# Patient Record
Sex: FEMALE | Race: WHITE | NOT HISPANIC OR LATINO | Employment: OTHER | RURAL
[De-identification: names, ages, dates, MRNs, and addresses within clinical notes are randomized per-mention and may not be internally consistent; named-entity substitution may affect disease eponyms.]

---

## 2021-01-05 ENCOUNTER — OFFICE VISIT CONVERTED (OUTPATIENT)
Dept: CARDIOLOGY | Facility: CLINIC | Age: 31
End: 2021-01-05
Attending: INTERNAL MEDICINE

## 2021-01-05 ENCOUNTER — CONVERSION ENCOUNTER (OUTPATIENT)
Dept: OTHER | Facility: HOSPITAL | Age: 31
End: 2021-01-05

## 2021-03-09 ENCOUNTER — OFFICE VISIT CONVERTED (OUTPATIENT)
Dept: CARDIOLOGY | Facility: CLINIC | Age: 31
End: 2021-03-09
Attending: INTERNAL MEDICINE

## 2021-03-09 ENCOUNTER — CONVERSION ENCOUNTER (OUTPATIENT)
Dept: CARDIOLOGY | Facility: CLINIC | Age: 31
End: 2021-03-09
Attending: INTERNAL MEDICINE

## 2021-05-10 NOTE — PROCEDURES
Procedure Note      Patient Name: Calli Urbano   Patient ID: [id number]   Sex: Female   YOB: 1990    Referring Provider: VIET VALE    Visit Date: March 9, 2021    Provider: Blaine Hoang MD   Location: [facility]   Location Address: [address] [place] [address], [facility]   [place], [place]  [id number]   Location Phone: [phone]          FINAL REPORT   CARDIAC MONITOR REPORT     30-DAY CARDIAC EVENT MONITOR      DURATION:   01/14/2021 - 02/12/2021    INDICATION:  Shortness of breath     FINDINGS:  The patient was monitored for 30 days. There were 14 total events, 8 were triggered by the patient and 6 were auto-triggered. There were episodes of SVT at a rate of age-predicted 200 with a narrow complex rhythm consistent with likely AV node reentry tachycardia. Otherwise there was sinus rhythm recorded.     CONCLUSION:  Abnormal event monitor showing episodes of narrow complex tachycardia consistent with SVT or AV node reentry with a heart rate of 200 to 220 beats per minute. These converted to sinus rhythm. They were symptomatic otherwise. There were episodes of mild sinus tachycardia noted. This report was discussed with the patient and treatment changes  were made at the time of the SVT.      TRISHA Hoang MD   CBD/pap                 Electronically Signed by: Judy Gillette-, Other -Author on March 15, 2021 09:02:49 AM  Electronically Co-signed by: Blaine Hoang MD -Reviewer on March 15, 2021 10:03:18 AM

## 2021-05-10 NOTE — H&P
History and Physical      Patient Name: Calli Urbano   Patient ID: [id number]   Sex: Female   YOB: 1990    Referring Provider: VIET VALE    Visit Date: January 5, 2021    Provider: Blaine Hoang MD   Location: [facility] [place]   Location Address: [address] [place] [address]  [id number]          Chief Complaint  · Palpitations      History Of Present Illness  Consult requested by: VIET VALE   Calli Urbano is a 30-year-old white female with a history of obesity. She has had intermittent palpitations for many years off and on. Sometimes it does not occur for many months and then it occurs daily for several weeks. It tended to be worse with pregnancy over time. In the past couple of months, she has had two episodes associated with shortness of breath lasting a couple of minutes each resulting in feeling of her pulse racing and dizziness. She did not have syncope. She denies chest pain.   PAST MEDICAL HISTORY: Morbid obesity; palpitations; history of heart murmur; depression. Surgical history negative.   PSYCHOSOCIAL HISTORY: Positive for history of mood change or depression. She is . Has caffeine daily. Denies alcohol or tobacco use. She is self-employed.   FAMILY HISTORY: Positive in father who had arrhythmia cardioversion at age 55.   CURRENT MEDICATIONS: Divalproex 250 mg b.i.d.; Sertraline 25 mg daily.   ALLERGIES: No known drug allergies.       Review of Systems  · Constitutional  o Admits  o : fatigue, good general health lately  o Denies  o : recent weight changes   · Eyes  o Denies  o : double vision  · HENT  o Denies  o : hearing loss or ringing, chronic sinus problem, swollen glands in neck  · Cardiovascular  o Admits  o : palpitations (fast, fluttering, or skipping beats), swelling (feet, ankles, hands), shortness of breath while walking or lying flat  o Denies  o : chest pain  · Respiratory  o Denies  o : chronic or frequent  "cough, asthma or wheezing, COPD  · Gastrointestinal  o Denies  o : ulcers, nausea or vomiting  · Neurologic  o Admits  o : headaches  o Denies  o : lightheaded or dizzy, stroke  · Musculoskeletal  o Admits  o : back pain  o Denies  o : joint pain  · Endocrine  o Denies  o : thyroid disease, diabetes, heat or cold intolerance, excessive thirst or urination  · Heme-Lymph  o Denies  o : bleeding or bruising tendency, anemia      Vitals  Date Time BP Position Site L\R Cuff Size HR RR TEMP (F) WT  HT  BMI kg/m2 BSA m2 O2 Sat FR L/min FiO2 HC       01/05/2021 02:13 /76 Sitting    100 - R   282lbs 0oz 5'  7\" 44.17 2.46             Physical Examination  · Constitutional  o Appearance  o : Obese white female, pleasant, in no acute distress.  · Head and Face  o HEENT  o : No pallor, anicteric. Eyes normal. Moist mucous membranes.  · Neck  o Inspection/Palpation  o : Supple. No hepatosplenomegaly.  o Jugular Veins  o : No JVD. No carotid bruits.  · Respiratory  o Auscultation of Lungs  o : Clear to auscultation bilaterally. No crackles or wheezing.  · Cardiovascular  o Heart  o : Grade 2 basal and parasternal systolic murmur. No diastolic murmur.  · Gastrointestinal  o Abdominal Examination  o : Soft, non-distended. No palpable hepatosplenomegaly. Bowel sounds heard in all four quadrants.  · Musculoskeletal  o General  o : Normal muscle tone and strength.  · Skin and Subcutaneous Tissue  o General Inspection  o : No skin rashes.  · Extremities  o Extremities  o : Warm and well perfused. Distal pulses present. No pitting pedal edema.  · EKG  o EKG  o : Reviewed by me.   o Results  o : Sinus rhythm, normal axis. No ST changes. Appears to be a normal EKG.  · Labs  o Labs  o : Recent labs show normal CMP, normal CBC, normal thyroid panel.     Primary care records reviewed.           Assessment     1.  Intermittent palpitations.   2.  Morbid obesity.   3.  Dizziness.          Plan     Based on the patient's symptoms, they are " relatively infrequent on average. A 30-day event monitor will be prescribed to try and catch these to see what her heart rhythm is during symptoms. An echocardiogram will be scheduled to evaluate her systolic murmur. We will call the patient with the results. If her workup is negative, a watchful waiting approach will be recommended for her palpitations if they are not frequent enough to catch on service monitoring. It is a pleasure to assist in her car. She is agreeable with this plan.      TRISHA Hoang MD   CBD/pap             Electronically Signed by: Judy Gillette-, Other -Author on January 8, 2021 11:42:54 AM  Electronically Co-signed by: Blaine Hoang MD -Reviewer on January 8, 2021 12:17:51 PM

## 2021-05-14 VITALS
HEIGHT: 67 IN | DIASTOLIC BLOOD PRESSURE: 76 MMHG | SYSTOLIC BLOOD PRESSURE: 101 MMHG | HEART RATE: 100 BPM | WEIGHT: 282 LBS | BODY MASS INDEX: 44.26 KG/M2

## 2021-05-14 VITALS
SYSTOLIC BLOOD PRESSURE: 127 MMHG | HEIGHT: 67 IN | DIASTOLIC BLOOD PRESSURE: 93 MMHG | WEIGHT: 276 LBS | BODY MASS INDEX: 43.32 KG/M2 | HEART RATE: 76 BPM

## 2021-05-14 NOTE — PROGRESS NOTES
"   Progress Note      Patient Name: Calli Urbano   Patient ID: [id number]   Sex: Female   YOB: 1990    Referring Provider: VIET VALE    Visit Date: March 9, 2021    Provider: Blaine Hoang MD   Location: [facility]   Location Address: [address] [place] [address]  [id number]          Chief Complaint     Follow-up of palpitations and abnormal event monitor.       History Of Present Illness  REFERRING CARE PROVIDER: VIET VALE   Calli Urbano is a 30 year old female. Since the last visit Calli had an event monitor showing narrow complex tachycardia, consistent with SVT or AV node reentry. These were symptomatic. Heart rate was 200 to 220. It was paroxysmal. She was started on Cartia  mg daily. Since then, she has felt much better. She has not had significant breakthrough palpitations since taking the medication.   PAST MEDICAL HISTORY: Morbid obesity; palpitations; history of heart murmur; depression. Surgical history negative.   PSYCHOSOCIAL HISTORY: Denies alcohol use.   CURRENT MEDICATIONS: Medication list was reviewed and is as documented.      ALLERGIES: No known drug allergies.       Review of Systems  · Cardiovascular  o Admits  o : palpitations (fast, fluttering, or skipping beats), swelling (feet, ankles, hands), shortness of breath while walking or lying flat  o Denies  o : chest pain or angina pectoris   · Respiratory  o Denies  o : chronic or frequent cough      Vitals  Date Time BP Position Site L\R Cuff Size HR RR TEMP (F) WT  HT  BMI kg/m2 BSA m2 O2 Sat FR L/min FiO2 HC       03/09/2021 09:56 /93 Sitting    76 - R   276lbs 0oz 5'  7\" 43.23 2.43             Physical Examination  · Respiratory  o Auscultation of Lungs  o : Clear to auscultation bilaterally. No crackles or rhonchi.  · Cardiovascular  o Heart  o : S1, S2 is normally heard. No S3. No murmur, rubs, or gallops.  · Gastrointestinal  o Abdominal " Examination  o : Soft, nontender, nondistended. No free fluid. Bowel sounds heard in all four quadrants.  · Extremities  o Extremities  o : Warm and well perfused. No pitting pedal edema. Distal pulses present.  · Echocardiogram  o Echocardiogram  o : Done on 01/12 which showed normal ejection fraction, normal diastolic function, trace tricuspid regurgitation.  · Diagnostic Testing  o Diagnostic Testing  o : I reviewed her event monitor tracing with her today.      GENERAL: Obese white female in no acute distress.           Assessment     1.  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 Symptomatically, she is improved on calcium channel blocker. Her echo was normal, basic labs showed no abnormalities.   2.  Obesity.         Plan     Continue Cartia  daily given resolution of her symptoms. Next, if she has significant breakthrough the plan would be to either increase the dose of the medication or consider EP referral for consideration for ablation. The patient is agreeable with this plan. She will call if she has breakthrough symptoms. Otherwise, follow-up will be arranged in 6 months.         TRISHA Hoang MD  CBD/                Electronically Signed by: Renetta Duran-, OT -Author on March 17, 2021 08:12:34 AM  Electronically Co-signed by: Blaine Hoang MD -Reviewer on March 17, 2021 01:45:16 PM

## 2021-09-14 ENCOUNTER — OFFICE VISIT (OUTPATIENT)
Dept: CARDIOLOGY | Facility: CLINIC | Age: 31
End: 2021-09-14

## 2021-09-14 VITALS
WEIGHT: 276 LBS | SYSTOLIC BLOOD PRESSURE: 129 MMHG | DIASTOLIC BLOOD PRESSURE: 90 MMHG | HEIGHT: 66 IN | BODY MASS INDEX: 44.36 KG/M2 | HEART RATE: 80 BPM

## 2021-09-14 DIAGNOSIS — E66.01 MORBID (SEVERE) OBESITY DUE TO EXCESS CALORIES (HCC): ICD-10-CM

## 2021-09-14 DIAGNOSIS — R00.2 PALPITATIONS: ICD-10-CM

## 2021-09-14 DIAGNOSIS — I47.1 PAROXYSMAL SVT (SUPRAVENTRICULAR TACHYCARDIA) (HCC): Primary | ICD-10-CM

## 2021-09-14 PROCEDURE — 99214 OFFICE O/P EST MOD 30 MIN: CPT | Performed by: INTERNAL MEDICINE

## 2021-09-14 RX ORDER — DILTIAZEM HYDROCHLORIDE 180 MG/1
180 CAPSULE, COATED, EXTENDED RELEASE ORAL DAILY
COMMUNITY
Start: 2021-07-19 | End: 2021-09-14

## 2021-09-14 RX ORDER — DILTIAZEM HYDROCHLORIDE 240 MG/1
240 CAPSULE, COATED, EXTENDED RELEASE ORAL DAILY
Qty: 90 CAPSULE | Refills: 3 | Status: SHIPPED | OUTPATIENT
Start: 2021-09-14 | End: 2023-02-02 | Stop reason: SDUPTHER

## 2021-09-14 NOTE — PATIENT INSTRUCTIONS
Calorie Counting for Weight Loss  Calories are units of energy. Your body needs a certain number of calories from food to keep going throughout the day. When you eat or drink more calories than your body needs, your body stores the extra calories mostly as fat. When you eat or drink fewer calories than your body needs, your body burns fat to get the energy it needs.  Calorie counting means keeping track of how many calories you eat and drink each day. Calorie counting can be helpful if you need to lose weight. If you eat fewer calories than your body needs, you should lose weight. Ask your health care provider what a healthy weight is for you.  For calorie counting to work, you will need to eat the right number of calories each day to lose a healthy amount of weight per week. A dietitian can help you figure out how many calories you need in a day and will suggest ways to reach your calorie goal.  · A healthy amount of weight to lose each week is usually 1-2 lb (0.5-0.9 kg). This usually means that your daily calorie intake should be reduced by 500-750 calories.  · Eating 1,200-1,500 calories a day can help most women lose weight.  · Eating 1,500-1,800 calories a day can help most men lose weight.  What do I need to know about calorie counting?  Work with your health care provider or dietitian to determine how many calories you should get each day. To meet your daily calorie goal, you will need to:  · Find out how many calories are in each food that you would like to eat. Try to do this before you eat.  · Decide how much of the food you plan to eat.  · Keep a food log. Do this by writing down what you ate and how many calories it had.  To successfully lose weight, it is important to balance calorie counting with a healthy lifestyle that includes regular activity.  Where do I find calorie information?    The number of calories in a food can be found on a Nutrition Facts label. If a food does not have a Nutrition Facts  label, try to look up the calories online or ask your dietitian for help.  Remember that calories are listed per serving. If you choose to have more than one serving of a food, you will have to multiply the calories per serving by the number of servings you plan to eat. For example, the label on a package of bread might say that a serving size is 1 slice and that there are 90 calories in a serving. If you eat 1 slice, you will have eaten 90 calories. If you eat 2 slices, you will have eaten 180 calories.  How do I keep a food log?  After each time that you eat, record the following in your food log as soon as possible:  · What you ate. Be sure to include toppings, sauces, and other extras on the food.  · How much you ate. This can be measured in cups, ounces, or number of items.  · How many calories were in each food and drink.  · The total number of calories in the food you ate.  Keep your food log near you, such as in a pocket-sized notebook or on an ramos or website on your mobile phone. Some programs will calculate calories for you and show you how many calories you have left to meet your daily goal.  What are some portion-control tips?  · Know how many calories are in a serving. This will help you know how many servings you can have of a certain food.  · Use a measuring cup to measure serving sizes. You could also try weighing out portions on a kitchen scale. With time, you will be able to estimate serving sizes for some foods.  · Take time to put servings of different foods on your favorite plates or in your favorite bowls and cups so you know what a serving looks like.  · Try not to eat straight from a food's packaging, such as from a bag or box. Eating straight from the package makes it hard to see how much you are eating and can lead to overeating. Put the amount you would like to eat in a cup or on a plate to make sure you are eating the right portion.  · Use smaller plates, glasses, and bowls for smaller  portions and to prevent overeating.  · Try not to multitask. For example, avoid watching TV or using your computer while eating. If it is time to eat, sit down at a table and enjoy your food. This will help you recognize when you are full. It will also help you be more mindful of what and how much you are eating.  What are tips for following this plan?  Reading food labels  · Check the calorie count compared with the serving size. The serving size may be smaller than what you are used to eating.  · Check the source of the calories. Try to choose foods that are high in protein, fiber, and vitamins, and low in saturated fat, trans fat, and sodium.  Shopping  · Read nutrition labels while you shop. This will help you make healthy decisions about which foods to buy.  · Pay attention to nutrition labels for low-fat or fat-free foods. These foods sometimes have the same number of calories or more calories than the full-fat versions. They also often have added sugar, starch, or salt to make up for flavor that was removed with the fat.  · Make a grocery list of lower-calorie foods and stick to it.  Cooking  · Try to cook your favorite foods in a healthier way. For example, try baking instead of frying.  · Use low-fat dairy products.  Meal planning  · Use more fruits and vegetables. One-half of your plate should be fruits and vegetables.  · Include lean proteins, such as chicken, turkey, and fish.  Lifestyle  Each week, aim to do one of the following:  · 150 minutes of moderate exercise, such as walking.  · 75 minutes of vigorous exercise, such as running.  General information  · Know how many calories are in the foods you eat most often. This will help you calculate calorie counts faster.  · Find a way of tracking calories that works for you. Get creative. Try different apps or programs if writing down calories does not work for you.  What foods should I eat?    · Eat nutritious foods. It is better to have a nutritious,  high-calorie food, such as an avocado, than a food with few nutrients, such as a bag of potato chips.  · Use your calories on foods and drinks that will fill you up and will not leave you hungry soon after eating.  ? Examples of foods that fill you up are nuts and nut butters, vegetables, lean proteins, and high-fiber foods such as whole grains. High-fiber foods are foods with more than 5 g of fiber per serving.  · Pay attention to calories in drinks. Low-calorie drinks include water and unsweetened drinks.  The items listed above may not be a complete list of foods and beverages you can eat. Contact a dietitian for more information.  What foods should I limit?  Limit foods or drinks that are not good sources of vitamins, minerals, or protein or that are high in unhealthy fats. These include:  · Candy.  · Other sweets.  · Sodas, specialty coffee drinks, alcohol, and juice.  The items listed above may not be a complete list of foods and beverages you should avoid. Contact a dietitian for more information.  How do I count calories when eating out?  · Pay attention to portions. Often, portions are much larger when eating out. Try these tips to keep portions smaller:  ? Consider sharing a meal instead of getting your own.  ? If you get your own meal, eat only half of it. Before you start eating, ask for a container and put half of your meal into it.  ? When available, consider ordering smaller portions from the menu instead of full portions.  · Pay attention to your food and drink choices. Knowing the way food is cooked and what is included with the meal can help you eat fewer calories.  ? If calories are listed on the menu, choose the lower-calorie options.  ? Choose dishes that include vegetables, fruits, whole grains, low-fat dairy products, and lean proteins.  ? Choose items that are boiled, broiled, grilled, or steamed. Avoid items that are buttered, battered, fried, or served with cream sauce. Items labeled as  crispy are usually fried, unless stated otherwise.  ? Choose water, low-fat milk, unsweetened iced tea, or other drinks without added sugar. If you want an alcoholic beverage, choose a lower-calorie option, such as a glass of wine or light beer.  ? Ask for dressings, sauces, and syrups on the side. These are usually high in calories, so you should limit the amount you eat.  ? If you want a salad, choose a garden salad and ask for grilled meats. Avoid extra toppings such as hoover, cheese, or fried items. Ask for the dressing on the side, or ask for olive oil and vinegar or lemon to use as dressing.  · Estimate how many servings of a food you are given. Knowing serving sizes will help you be aware of how much food you are eating at restaurants.  Where to find more information  · Centers for Disease Control and Prevention: www.cdc.gov  · U.[place]. Department of Agriculture: myplate.gov  Summary  · Calorie counting means keeping track of how many calories you eat and drink each day. If you eat fewer calories than your body needs, you should lose weight.  · A healthy amount of weight to lose per week is usually 1-2 lb (0.5-0.9 kg). This usually means reducing your daily calorie intake by 500-750 calories.  · The number of calories in a food can be found on a Nutrition Facts label. If a food does not have a Nutrition Facts label, try to look up the calories online or ask your dietitian for help.  · Use smaller plates, glasses, and bowls for smaller portions and to prevent overeating.  · Use your calories on foods and drinks that will fill you up and not leave you hungry shortly after a meal.  This information is not intended to replace advice given to you by your health care provider. Make sure you discuss any questions you have with your health care provider.  Document Revised: 01/28/2021 Document Reviewed: 01/28/2021  Elsevier Patient Education © 2021 [employer]

## 2021-09-14 NOTE — PROGRESS NOTES
"Chief Complaint  STILL HAS SOME SVT BUT NOT LIKE IT WAS BEFORE ADDING CARDIZE, Rapid Heart Rate (NOT AS MUCH), and NUMBNESS IN RIGHT ARM/THEN PAIN    Subjective            Calli Urbano presents to [facility]  History of Present Illness    Calli is here for follow-up evaluation management of PSVT and palpitations. Since last visit she has had palpitations about once a week lasting 5 to 10 minutes. Overall they are better since starting medication. She had narrow complex tachycardia on previous event monitor consistent with likely AVNRT.    PMH  Past Medical History:   Diagnosis Date   • Abnormal ECG    • Sleep apnea          SURGICALHX  History reviewed. No pertinent surgical history.     SOC  Social History     Socioeconomic History   • Marital status:      Spouse name: Not on file   • Number of children: Not on file   • Years of education: Not on file   • Highest education level: Not on file   Tobacco Use   • Smoking status: Never Smoker   • Smokeless tobacco: Never Used   Vaping Use   • Vaping Use: Never used   Substance and Sexual Activity   • Alcohol use: Never   • Drug use: Never         FAMHX  Family History   Problem Relation Age of Onset   • Heart disease Father           ALLERGY  No Known Allergies     MEDSCURRENT    Current Outpatient Medications:   •  dilTIAZem CD (CARDIZEM CD) 240 MG 24 hr capsule, Take 1 capsule by mouth Daily., Disp: 90 capsule, Rfl: 3      Review of Systems   Cardiovascular: Positive for palpitations.        Objective     /90   Pulse 80   Ht 167.6 cm (66\")   Wt 125 kg (276 lb)   BMI 44.55 kg/m²       General Appearance:   · well developed  · well nourished  HENT:   · oropharynx moist  · lips not cyanotic  Neck:  · thyroid not enlarged  · supple  Respiratory:  · no respiratory distress  · normal breath sounds  · no rales  Cardiovascular:  · no jugular venous distention  · regular rhythm  · apical impulse normal  · S1 normal, S2 " normal  · no S3, no S4   · no murmur  · no rub, no thrill  · carotid pulses normal; no bruit  · pedal pulses normal  · lower extremity edema: none    Musculoskeletal:  · no clubbing of fingers.   · normocephalic, head atraumatic  Skin:   · warm, dry  Psychiatric:  · judgement and insight appropriate  · normal mood and affect      Result Review :{Labs  Result Review  Imaging  Med Tab  Media :23}                  Procedures      Patient's Body mass index is 44.55 kg/m². indicating that she is morbidly obese (BMI > 40 or > 35 with obesity - related health condition). Obesity-related health conditions include the following: none. Obesity is unchanged. BMI is is above average; BMI management plan is completed. We discussed portion control, increasing exercise and an ramos-based approach such as CareCentrix Pal or Lose It..         Assessment and Plan        ASSESSMENT:  Encounter Diagnoses   Name Primary?   • Paroxysmal SVT (supraventricular tachycardia) (CMS/HCC) Yes   • Morbid (severe) obesity due to excess calories (CMS/HCC)    • Palpitations          PLAN:    1. Increase diltiazem to 240 mg daily to control SVT, if we are unable to control with medication will refer to EP to consider ablation  2. Weight loss counseling as above  3. Follow-up in 6 months          Patient was given instructions and counseling regarding her condition or for health maintenance advice. Please see specific information pulled into the AVS if appropriate.             RAHAT Hoang MD  9/14/2021    10:04 EDT

## 2022-03-15 ENCOUNTER — OFFICE VISIT (OUTPATIENT)
Dept: CARDIOLOGY | Facility: CLINIC | Age: 32
End: 2022-03-15

## 2022-03-15 VITALS
WEIGHT: 230 LBS | HEIGHT: 66 IN | DIASTOLIC BLOOD PRESSURE: 78 MMHG | HEART RATE: 80 BPM | SYSTOLIC BLOOD PRESSURE: 120 MMHG | BODY MASS INDEX: 36.96 KG/M2

## 2022-03-15 DIAGNOSIS — I47.1 PAROXYSMAL SVT (SUPRAVENTRICULAR TACHYCARDIA): Primary | ICD-10-CM

## 2022-03-15 DIAGNOSIS — E66.01 MORBID (SEVERE) OBESITY DUE TO EXCESS CALORIES: ICD-10-CM

## 2022-03-15 PROCEDURE — 99213 OFFICE O/P EST LOW 20 MIN: CPT | Performed by: INTERNAL MEDICINE

## 2022-03-15 NOTE — PROGRESS NOTES
"Chief Complaint  Paroxymal SVT    Subjective            Calli Urbano presents to [facility] CARDIOLOGY  History of Present Illness    Calli is here for follow-up of SVT.  Since the last visit she has continued to have very clean nutrition, and she is lost about 46 pounds since that visit.  She is feeling much better.  She has only had 5 very brief breakthroughs of palpitations since then.  She is staying very active.    PMH  Past Medical History:   Diagnosis Date   • Abnormal ECG    • Sleep apnea          SURGICALHX  History reviewed. No pertinent surgical history.     SOC  Social History     Socioeconomic History   • Marital status:    Tobacco Use   • Smoking status: Never Smoker   • Smokeless tobacco: Never Used   Vaping Use   • Vaping Use: Never used   Substance and Sexual Activity   • Alcohol use: Never   • Drug use: Never   • Sexual activity: Defer         FAMHX  Family History   Problem Relation Age of Onset   • Heart disease Father           ALLERGY  No Known Allergies     MEDSCURRENT    Current Outpatient Medications:   •  dilTIAZem CD (CARDIZEM CD) 240 MG 24 hr capsule, Take 1 capsule by mouth Daily., Disp: 90 capsule, Rfl: 3      Review of Systems   Cardiovascular: Positive for palpitations.        Objective     /78   Pulse 80   Ht 167.6 cm (66\")   Wt 104 kg (230 lb)   BMI 37.12 kg/m²       General Appearance:   · well developed  · well nourished  HENT:   · oropharynx moist  · lips not cyanotic  Neck:  · thyroid not enlarged  · supple  Respiratory:  · no respiratory distress  · normal breath sounds  · no rales  Cardiovascular:  · no jugular venous distention  · regular rhythm  · apical impulse normal  · S1 normal, S2 normal  · no S3, no S4   · no murmur  · no rub, no thrill  · carotid pulses normal; no bruit  · pedal pulses normal  · lower extremity edema: none    Musculoskeletal:  · no clubbing of fingers.   · normocephalic, head atraumatic  Skin:   · warm, " dry  Psychiatric:  · judgement and insight appropriate  · normal mood and affect      Result Review :                  Procedures               Assessment and Plan        ASSESSMENT:  Encounter Diagnoses   Name Primary?   • Paroxysmal SVT (supraventricular tachycardia) (HCC) Yes   • Morbid (severe) obesity due to excess calories (HCC)          PLAN:    1.  SVT is clinically stable on calcium channel blocker therapy.  Continue same  2.  Obesity improving, the patient is losing weight about 1-1/2 pounds per week.  She will continue her current regimen.  3.  Follow-up in 1 year          Patient was given instructions and counseling regarding her condition or for health maintenance advice. Please see specific information pulled into the AVS if appropriate.             RAHAT Hoang MD  3/15/2022    09:53 EDT

## 2023-02-02 RX ORDER — DILTIAZEM HYDROCHLORIDE 240 MG/1
240 CAPSULE, COATED, EXTENDED RELEASE ORAL DAILY
Qty: 90 CAPSULE | Refills: 3 | Status: SHIPPED | OUTPATIENT
Start: 2023-02-02 | End: 2023-03-28 | Stop reason: SDUPTHER

## 2023-03-28 ENCOUNTER — OFFICE VISIT (OUTPATIENT)
Dept: CARDIOLOGY | Facility: CLINIC | Age: 33
End: 2023-03-28
Payer: COMMERCIAL

## 2023-03-28 VITALS
WEIGHT: 255 LBS | SYSTOLIC BLOOD PRESSURE: 137 MMHG | HEART RATE: 78 BPM | HEIGHT: 66 IN | BODY MASS INDEX: 40.98 KG/M2 | DIASTOLIC BLOOD PRESSURE: 96 MMHG

## 2023-03-28 DIAGNOSIS — I47.1 PAROXYSMAL SVT (SUPRAVENTRICULAR TACHYCARDIA): Primary | ICD-10-CM

## 2023-03-28 DIAGNOSIS — R00.2 PALPITATIONS: ICD-10-CM

## 2023-03-28 DIAGNOSIS — E66.01 MORBID (SEVERE) OBESITY DUE TO EXCESS CALORIES: ICD-10-CM

## 2023-03-28 PROCEDURE — 1160F RVW MEDS BY RX/DR IN RCRD: CPT | Performed by: INTERNAL MEDICINE

## 2023-03-28 PROCEDURE — 1159F MED LIST DOCD IN RCRD: CPT | Performed by: INTERNAL MEDICINE

## 2023-03-28 PROCEDURE — 99214 OFFICE O/P EST MOD 30 MIN: CPT | Performed by: INTERNAL MEDICINE

## 2023-03-28 RX ORDER — DILTIAZEM HYDROCHLORIDE 240 MG/1
240 CAPSULE, COATED, EXTENDED RELEASE ORAL DAILY
Qty: 90 CAPSULE | Refills: 3 | Status: SHIPPED | OUTPATIENT
Start: 2023-03-28

## 2023-03-28 NOTE — PROGRESS NOTES
"Chief Complaint  paroxysmal svt and Palpitations    Subjective            Calli Urbano presents to [facility] CARDIOLOGY  History of Present Illness    Calli is here for follow-up of SVT.  Overall she is doing relatively well.  She has had some minor breakthrough palpitations over the past year.  She had a very prolonged course and recovering from a tubal ligation back in the summer.  Unfortunately she gained quite a bit of weight since then after having lost quite a bit of weight the previous year.  She seems to be somewhat depressed and is emotional and tearful during the visit today as well.    PMH  Past Medical History:   Diagnosis Date   • Abnormal ECG    • Sleep apnea          SURGICALHX  History reviewed. No pertinent surgical history.     SOC  Social History     Socioeconomic History   • Marital status:    Tobacco Use   • Smoking status: Never   • Smokeless tobacco: Never   Vaping Use   • Vaping Use: Never used   Substance and Sexual Activity   • Alcohol use: Never   • Drug use: Never   • Sexual activity: Defer         FAMHX  Family History   Problem Relation Age of Onset   • Heart disease Father           ALLERGY  No Known Allergies     MEDSCURRENT    Current Outpatient Medications:   •  dilTIAZem CD (CARDIZEM CD) 240 MG 24 hr capsule, Take 1 capsule by mouth Daily., Disp: 90 capsule, Rfl: 3      Review of Systems   Cardiovascular: Positive for palpitations.        Objective     /96   Pulse 78   Ht 167.6 cm (66\")   Wt 116 kg (255 lb)   BMI 41.16 kg/m²       General Appearance:   · well developed  · well nourished  HENT:   · oropharynx moist  · lips not cyanotic  Neck:  · thyroid not enlarged  · supple  Respiratory:  · no respiratory distress  · normal breath sounds  · no rales  Cardiovascular:  · no jugular venous distention  · regular rhythm  · apical impulse normal  · S1 normal, S2 normal  · no S3, no S4   · no murmur  · no rub, no thrill  · carotid pulses normal; " no bruit  · pedal pulses normal  · lower extremity edema: none    Musculoskeletal:  · no clubbing of fingers.   · normocephalic, head atraumatic  Skin:   · warm, dry  Psychiatric:  · judgement and insight appropriate  · Depressed mood and affect      Result Review :                  Procedures               Assessment and Plan        ASSESSMENT:  Encounter Diagnoses   Name Primary?   • Paroxysmal SVT (supraventricular tachycardia) (HCC) Yes   • Morbid (severe) obesity due to excess calories (HCC)    • Palpitations          PLAN:    1.  SVT is clinically stable, minor breakthrough palpitations over the year but she had a lot of medical stress last summer.  Continue calcium channel blocker.  2.  Obesity-she had significant weight gain since the last visit and I encouraged her to get back on her previous nutrition plan which was very effective.  She is going to try and make some of these changes.  3.  Follow-up in 1 year unless problems arise          Patient was given instructions and counseling regarding her condition or for health maintenance advice. Please see specific information pulled into the AVS if appropriate.             RAHAT Hoang MD  3/28/2023    12:26 EDT

## 2024-05-10 ENCOUNTER — OFFICE VISIT (OUTPATIENT)
Dept: CARDIOLOGY | Facility: CLINIC | Age: 34
End: 2024-05-10
Payer: COMMERCIAL

## 2024-05-10 VITALS
HEIGHT: 66 IN | DIASTOLIC BLOOD PRESSURE: 96 MMHG | HEART RATE: 83 BPM | BODY MASS INDEX: 41.98 KG/M2 | WEIGHT: 261.2 LBS | SYSTOLIC BLOOD PRESSURE: 126 MMHG

## 2024-05-10 DIAGNOSIS — E66.01 MORBID OBESITY WITH BMI OF 40.0-44.9, ADULT: ICD-10-CM

## 2024-05-10 DIAGNOSIS — I47.10 PAROXYSMAL SVT (SUPRAVENTRICULAR TACHYCARDIA): Primary | ICD-10-CM

## 2024-05-10 DIAGNOSIS — G47.33 OBSTRUCTIVE SLEEP APNEA: ICD-10-CM

## 2024-05-10 RX ORDER — DILTIAZEM HYDROCHLORIDE 240 MG/1
240 CAPSULE, COATED, EXTENDED RELEASE ORAL DAILY
Qty: 90 CAPSULE | Refills: 3 | Status: SHIPPED | OUTPATIENT
Start: 2024-05-10

## 2025-06-17 ENCOUNTER — OFFICE VISIT (OUTPATIENT)
Dept: CARDIOLOGY | Facility: CLINIC | Age: 35
End: 2025-06-17
Payer: COMMERCIAL

## 2025-06-17 VITALS
SYSTOLIC BLOOD PRESSURE: 133 MMHG | HEART RATE: 82 BPM | DIASTOLIC BLOOD PRESSURE: 89 MMHG | HEIGHT: 66 IN | WEIGHT: 232.2 LBS | BODY MASS INDEX: 37.32 KG/M2 | OXYGEN SATURATION: 98 %

## 2025-06-17 DIAGNOSIS — I47.10 PAROXYSMAL SVT (SUPRAVENTRICULAR TACHYCARDIA): Primary | ICD-10-CM

## 2025-06-17 DIAGNOSIS — G47.33 OBSTRUCTIVE SLEEP APNEA: ICD-10-CM

## 2025-06-17 DIAGNOSIS — E66.01 MORBID (SEVERE) OBESITY DUE TO EXCESS CALORIES: ICD-10-CM

## 2025-06-17 PROCEDURE — 1160F RVW MEDS BY RX/DR IN RCRD: CPT | Performed by: INTERNAL MEDICINE

## 2025-06-17 PROCEDURE — 99214 OFFICE O/P EST MOD 30 MIN: CPT | Performed by: INTERNAL MEDICINE

## 2025-06-17 PROCEDURE — 1159F MED LIST DOCD IN RCRD: CPT | Performed by: INTERNAL MEDICINE

## 2025-06-17 RX ORDER — DILTIAZEM HYDROCHLORIDE 240 MG/1
240 CAPSULE, COATED, EXTENDED RELEASE ORAL DAILY
Qty: 90 CAPSULE | Refills: 3 | Status: SHIPPED | OUTPATIENT
Start: 2025-06-17

## 2025-06-17 NOTE — PROGRESS NOTES
Chief Complaint  Follow-up (1 year)    Subjective            Calli Urbano presents to [facility] CARDIOLOGY  History of Present Illness    Calli is here for follow-up of SVT, obstructive sleep apnea, obesity.  Since the last visit from a cardiac standpoint she has been doing relatively well.  She was on Mounjaro and losing weight successfully but this was stopped.  Since then she has noticed more episodes of tachycardia as well as numbness and tingling in her right hand.  It sounds like it is trying to be approved again for use.  She has lost quite a bit of weight but remains obese.    PMH  Past Medical History:   Diagnosis Date    Abnormal ECG     Sleep apnea          SURGICALHX  No past surgical history on file.     SOC  Social History     Socioeconomic History    Marital status:    Tobacco Use    Smoking status: Never    Smokeless tobacco: Never   Vaping Use    Vaping status: Never Used   Substance and Sexual Activity    Alcohol use: Never    Drug use: Never    Sexual activity: Defer         FAMHX  Family History   Problem Relation Age of Onset    Heart disease Father           ALLERGY  No Known Allergies     MEDSCURRENT    Current Outpatient Medications:     cholecalciferol (VITAMIN D3) 1.25 MG (66547 UT) capsule, Take 1 capsule by mouth Every 7 (Seven) Days., Disp: , Rfl:     dilTIAZem CD (CARDIZEM CD) 240 MG 24 hr capsule, Take 1 capsule by mouth Daily., Disp: 90 capsule, Rfl: 3    Tirzepatide (Mounjaro) 2.5 MG/0.5ML solution pen-injector pen, Inject 0.5 mL under the skin into the appropriate area as directed 1 (One) Time Per Week. (Patient not taking: Reported on 6/17/2025), Disp: , Rfl:       Review of Systems   Constitutional: Positive for weight loss.   Cardiovascular:  Positive for palpitations. Negative for chest pain, dyspnea on exertion, irregular heartbeat, leg swelling, near-syncope and syncope.   Neurological:  Positive for excessive daytime sleepiness, numbness and  "paresthesias.        Objective     /89   Pulse 82   Ht 167.6 cm (66\")   Wt 105 kg (232 lb 3.2 oz)   SpO2 98%   BMI 37.48 kg/m²       General Appearance:   well developed  well nourished  HENT:   oropharynx moist  lips not cyanotic  Neck:  thyroid not enlarged  supple  Respiratory:  no respiratory distress  normal breath sounds  no rales  Cardiovascular:  no jugular venous distention  regular rhythm  apical impulse normal  S1 normal, S2 normal  no S3, no S4   no murmur  no rub, no thrill  carotid pulses normal; no bruit  pedal pulses normal  lower extremity edema: none    Musculoskeletal:  no clubbing of fingers.   normocephalic, head atraumatic  Skin:   warm, dry  Psychiatric:  judgement and insight appropriate  normal mood and affect      Result Review :     The following data was reviewed by: Blaine Hoang MD on 05/10/2024:                   Rosemarie Urbano  reports that she has never smoked. She has never used smokeless tobacco. I have educated her on the risk of diseases from using tobacco products such as cancer, COPD, and heart disease.              Assessment and Plan        ASSESSMENT:  Encounter Diagnoses   Name Primary?    Paroxysmal SVT (supraventricular tachycardia) Yes    Obstructive sleep apnea     Morbid (severe) obesity due to excess calories          PLAN:    1.  Paroxysmal SVT-overall stable but somewhat more symptomatology since coming off Mounjaro.  Continue calcium channel blocker.  2.  Obstructive sleep apnea-pliant with CPAP therapy  3.  Obesity-successful losing weight on Mounjaro.  I recommend she continue to make efforts to lose weight, consider an additional course of Mounjaro if possible.    Follow-up annually.      Patient was given instructions and counseling regarding her condition or for health maintenance advice. Please see specific information pulled into the AVS if appropriate.           Blaine Hoang MD   6/17/2025  15:18 " EDT